# Patient Record
Sex: FEMALE | Race: WHITE
[De-identification: names, ages, dates, MRNs, and addresses within clinical notes are randomized per-mention and may not be internally consistent; named-entity substitution may affect disease eponyms.]

---

## 2018-01-29 ENCOUNTER — HOSPITAL ENCOUNTER (OUTPATIENT)
Dept: HOSPITAL 80 - FIMAGING | Age: 38
End: 2018-01-29
Attending: OBSTETRICS & GYNECOLOGY
Payer: COMMERCIAL

## 2018-01-29 DIAGNOSIS — O36.62X0: ICD-10-CM

## 2018-01-29 DIAGNOSIS — O09.522: Primary | ICD-10-CM

## 2018-01-29 DIAGNOSIS — Z98.891: ICD-10-CM

## 2018-01-29 DIAGNOSIS — Z3A.21: ICD-10-CM

## 2018-06-04 ENCOUNTER — HOSPITAL ENCOUNTER (INPATIENT)
Dept: HOSPITAL 80 - FLD | Age: 38
LOS: 2 days | Discharge: HOME | End: 2018-06-06
Attending: OBSTETRICS & GYNECOLOGY | Admitting: OBSTETRICS & GYNECOLOGY
Payer: COMMERCIAL

## 2018-06-04 DIAGNOSIS — O34.219: Primary | ICD-10-CM

## 2018-06-04 DIAGNOSIS — Z3A.39: ICD-10-CM

## 2018-06-04 DIAGNOSIS — Z30.2: ICD-10-CM

## 2018-06-04 LAB — PLATELET # BLD: 98 10^3/UL (ref 150–400)

## 2018-06-04 PROCEDURE — 0UB70ZZ EXCISION OF BILATERAL FALLOPIAN TUBES, OPEN APPROACH: ICD-10-PCS | Performed by: OBSTETRICS & GYNECOLOGY

## 2018-06-04 RX ADMIN — Medication SCH MG: at 20:22

## 2018-06-04 RX ADMIN — KETOROLAC TROMETHAMINE PRN MG: 30 INJECTION, SOLUTION INTRAMUSCULAR at 20:21

## 2018-06-04 RX ADMIN — KETOROLAC TROMETHAMINE PRN MG: 30 INJECTION, SOLUTION INTRAMUSCULAR at 10:47

## 2018-06-04 RX ADMIN — DOCUSATE SODIUM AND SENNOSIDES SCH TAB: 50; 8.6 TABLET ORAL at 20:22

## 2018-06-04 NOTE — OBDEL
Birth Info


Birth Type: Repeat 


Presentation at Delivery: Vertex


L&D Analgesia/Anesthesia Type: Spinal


GBS+: No


Intrapartum Medications: 





 














Discontinued Medications














Generic Name Dose Route Start Last Admin





  Trade Name Dahiana  PRN Reason Stop Dose Admin


 


Cefazolin Sodium/Dextrose  100 mls @ 200 mls/hr  18 05:39  18 07:38





  Ancef 2 Gm  IV  18 06:08  100 mls





  ONCALL ONE   Administration





  Protocol   














- Infant Care Provider


Pediatrician/NNP: Tomasa Boyd





- Hospital Course


Intrapartum: 





18 10:23


IUP @ 39 weeks with previous c/s secondary to breech who desires RCS, declines 

trial of labor and requests permanent sterilization-family status is complete. 

AMA with negative NIPT. LGA fetus with EFW >90%. 


Indications for Delivery: Elective (Previous c/s, declines trial of labor)





 Operative Report





- Delivery


Pre-op Diagnoses: IUP @ 39 weeks with previous c/s who desires RCS, declines a 

trial of labor; request for permanent sterilization-family status is complete


Post-op Diagnoses: IUP @ 39 weeks with previous c/s who desires RCS, declines a 

trial of labor; request for permanent sterilization-family status is complete


History of Prior  Section: Yes


Number of Prior  Sections: 1


Indications for Prior  Section: Breech


Indications for Current  Section: Elective/Repeat (Declines trial of 

labor)


Procedure: Scheduled, Low Transverse, Tubal Ligation (Modified Analia)


Surgeon: Enma Garcia


Assistant: Lucrecia Cintron


Anesthesiologist: Pedro Daniel


Complications: None


Findings: 





A viable male infant in cephalic presentation born at 0835 with 8 and 8 Apgars 

weighing 10-2. Delayed cord clamping x 60 sec. Cord clamped x 2 and then cut. 

Infant to waiting NNP. Cord blood obtained. Placenta delivered intact 

spontaneously with 3-vc. Grossly normal appearing uterus, tubes and ovaries.


Specimen(s)/Path: Fallopian Tube(s) (Portions of b/l tubes)


IV Fluid (ml): 3,200


EBL: 800 cc                                 UO: 50 cc clear urine





 Birth Data


ANKUR: 18


Gestational Age: 39 week(s) and 0 day(s)


  ** Tellez


Delivery Date: 18


Delivery Time: 08:35


Sex of Infant: Male


Apgar Score (1 Min): 8


Apgar Score (5 Min): 8





ICD10 Worksheet


Patient Problems: 


 Problems











Problem Status Onset


 


Status post repeat low transverse  section Acute  


 


Encounter for female sterilization procedure Acute  


 


Previous  delivery affecting pregnancy Acute

## 2018-06-04 NOTE — PDGENHP
History & Physical


Chief Complaint: IUP @ 39 weeks with previous c/s for RCS and request for 

sterilization 


History of Present Illness: 36 y/o  @ 39 weeks with ANKUR 18 by u/s 

at 8 2/7 weeks with previous c/s for breech and LGA who desires RCS and 

requests permanent sterilization, family status is complete. Pregnancy 

complicated by AMA, negative NIPT. Previous c/s for breech and LGA; growth u/s 

in third trimester revealed EFW >90% with normal MARIE. Platelets being followed, 

<100 but stable. She has breast implants, desires to breastfeed. She developed 

anemia of pregnancy and tolerating iron. Received Tdap. GBS culture is 

negative.  PNLs: A+, Ab negative; RPR NR; Rubella immune; HBaAg negative; HIV 

negative; Trio screen neg ; Standardf panel neg ; UA/cx negative; Pap, 

GC/CT cx's all negative; Innatal negative; AFP negative; H/H 11.7/33.9; 1hr GTT 

80; GBS negative


Pertinent Past, Social, Family History: PObHx: 2015-PCS at 39 weeks secondary 

to breech, viable female infant weighing 9-4.  PGynHx: Menarche at age 13; 

cycles every 28 days x 4 days; no h/o abn pap smears or STDs.  PMHx: 

Unremarkable.  PSHx: PCS in ; breast implants in ; wisdom teeth 

extraction at age 17.  FamHx: MGF with prostate Ca; MGM with lymphoma.  SocHx: 

Pt is  and lives with her  and their daughter; denies any alcohol

, tobacco and illicit drug use


Relevant Physical Exam: WN, WD female.  Alert and oriented x 3.  CV: RRR 

without murmur.  Resp: LCTA b/l.  Abd: Gravid, nontender, nondistened, active 

BS.  Pelvic: Def.  Ext: Normal to inspection without calf edema or tenderness


Cardiorespiratory Assessment: A/P: 36 y/o  @ 39 weeks with previous c/s 

for RCS, declines trial of labor and request for permanent sterilization, 

family status is complete.  -Admit to L&D.  -Consents on chart and reviewed 

with pt; pt is aware of risks and wants to proceed with surgery and states 

family status is complete.  -Abx on call to OR.  -SCDs for DVT prophylaxis

## 2018-06-04 NOTE — PREANESOB
Obstetric Pre-Anesthesia Info





- General Info


Proposed Procedure: Repeat C Section and bilateral tubal ligation.


: 2


Para: 1


ANKUR: 18


Gestational Age: 39 week(s) and 0 day(s)





- Fetal Info


Fetal Status: Full Term


Fetal Monitors: External


FHR Baseline (bpm): 120


FHR Pattern: Reassuring





- Labor Status


Indications for Current  Section: Elective/Repeat


Labor Epidural: No





Anesthesia


ROS: 


Prior breast surgery, wisdom teeth removal, and C Section for breech.


Allergies/Adverse Reactions: 


 











Allergy/AdvReac Type Severity Reaction Status Date / Time


 


No Known Allergies Allergy   Unverified 01/26/15 08:34











Home Medications: 














 Medication  Instructions  Recorded


 


Hydrocodone/APAP 5/325 [Norco 1 - 2 tab PO Q4 PRN #30 tab 01/29/15





5/325 (*)]  


 


Ibuprofen [Motrin (*)] 600 mg PO Q6 PRN #30 tab 01/29/15


 


Iron Polysacch/Iron Heme Polyp 28 mg PO DAILY #30 tab 01/29/15





[Bifera]  











Visit Medications: 





 











Generic Name Dose Route Start Last Admin





  Trade Name Freq  PRN Reason Stop Dose Admin


 


Diphenhydramine HCl  25 - 50 mg  18 09:58  





  Benadryl Injection  IVP  18 09:57  





  Q6HRS PRN   





  Itching   


 


Lactated Ringer's  1,000 mls @ 125 mls/hr  18 06:00  





  Lr  IV  18 05:59  





  CONT TAQUERIA   


 


Naloxone HCl  0.4 mg  18 09:58  





  Narcan  IVP  18 10:00  





  PRN PRN   





  respiratory depression   


 


Ondansetron HCl  4 mg  18 09:58  





  Zofran  IVP  18 09:57  





  Q4HRS PRN   





  Nausea/Vomiting, Can't Take PO   


 


Phenylephrine HCl  100 mcg  18 09:58  





  Neosynephrine  IVP  18 10:58  





  Q1M PRN   





  Hypotension   














Discontinued Medications














Generic Name Dose Route Start Last Admin





  Trade Name Freq  PRN Reason Stop Dose Admin


 


Citric Acid/Sodium Citrate  30 ml  18 05:39  





  Bicitra  PO  18 05:40  





  ONCALL ONE   


 


Dexamethasone  Confirm  18 08:29  





  Decadron Injection  Administered  18 08:30  





  Dose   





  4 mg   





  .ROUTE   





  .STK-MED ONE   


 


Dexamethasone  Confirm  18 08:29  





  Decadron Injection  Administered  18 08:30  





  Dose   





  4 mg   





  .ROUTE   





  .STK-MED ONE   


 


Fentanyl  Confirm  18 07:51  





  Sublimaze  Administered  18 07:52  





  Dose   





  100 mcg   





  .ROUTE   





  .STK-MED ONE   


 


Cefazolin Sodium/Dextrose  100 mls @ 200 mls/hr  18 05:39  18 07:38





  Ancef 2 Gm  IV  18 06:08  100 mls





  ONCALL ONE   Administration





  Protocol   


 


Lactated Ringer's  500 mls @ 0 mls/hr  18 05:39  





  Lr  IV  18 05:40  





  ONCE ONE   





  As Directed   


 


Morphine Sulfate  Confirm  18 07:51  





  Morphine Pf 5 Mg/10 Ml  Administered  18 07:52  





  Dose   





  5 mg   





  .ROUTE   





  .STK-MED ONE   


 


Ondansetron HCl  Confirm  18 08:29  





  Zofran  Administered  18 08:30  





  Dose   





  4 mg   





  .ROUTE   





  .STK-MED ONE   


 


Ondansetron HCl  Confirm  18 08:29  





  Zofran  Administered  18 08:30  





  Dose   





  4 mg   





  .ROUTE   





  .STK-MED ONE   


 


Oxytocin  Confirm  18 08:29  





  Pitocin  Administered  18 08:30  





  Dose   





  100 units   





  .ROUTE   





  .STK-MED ONE   


 


Phenylephrine HCl  Confirm  18 08:24  





  Neosynephrine  Administered  18 08:25  





  Dose   





  2,000 mcg   





  .ROUTE   





  .STK-MED ONE   


 


Phenylephrine HCl  Confirm  18 08:29  





  Neosynephrine  Administered  18 08:30  





  Dose   





  1,000 mcg   





  .ROUTE   





  .STK-MED ONE   














- Anesthesia History


Response to Local Anesthetics: Normal


Anesthesia & Operative History: No Prior Problems


Family Anesthesia History: Negative





- Social History


Substance Use/Abuse: Denies





- Vital Signs


Latest Vital Signs (Nursing): 





 











Temp Pulse Resp BP Pulse Ox


 


 36.6 C   84   19   91/64 L  97 


 


 18 07:30  18 07:30  18 09:35  18 09:35  18 09:35











Blood Pressure: 125/79


Heart Rate: 64


Height/Weight (Nursing): 





 











Height                         167.64 cm


 


Weight                         73.936 kg

















- Focused Exam


Neck exam: FROM


Mallampati Score: Class 3


Mouth exam: normal dental/mouth exam


Pulmonary: no respiratory distress


Cardiovascular: regular rate and rhythym


Labs: 





 





 18 05:50 





 











Patient ABO/Rh  A POSITIVE   18  05:50    














- Plan


Anesthetic Plan: SAB


Consent Signed and on Chart: Yes


Patient/Guardian Understands and Agrees to Plan: Yes


Urgent/Emergent Case: Ame pérez completed preop but documented later for safe 

timely pt care

## 2018-06-05 RX ADMIN — KETOROLAC TROMETHAMINE PRN MG: 30 INJECTION, SOLUTION INTRAMUSCULAR at 08:29

## 2018-06-05 RX ADMIN — IBUPROFEN PRN MG: 600 TABLET ORAL at 16:11

## 2018-06-05 RX ADMIN — IBUPROFEN PRN MG: 600 TABLET ORAL at 21:56

## 2018-06-05 RX ADMIN — DOCUSATE SODIUM AND SENNOSIDES SCH TAB: 50; 8.6 TABLET ORAL at 22:56

## 2018-06-05 RX ADMIN — Medication SCH MG: at 21:56

## 2018-06-05 RX ADMIN — HYDROCODONE BITARTRATE AND ACETAMINOPHEN PRN TAB: 5; 325 TABLET ORAL at 17:11

## 2018-06-05 RX ADMIN — KETOROLAC TROMETHAMINE PRN MG: 30 INJECTION, SOLUTION INTRAMUSCULAR at 02:01

## 2018-06-05 RX ADMIN — HYDROCODONE BITARTRATE AND ACETAMINOPHEN PRN TAB: 5; 325 TABLET ORAL at 17:53

## 2018-06-05 RX ADMIN — DOCUSATE SODIUM AND SENNOSIDES SCH TAB: 50; 8.6 TABLET ORAL at 08:31

## 2018-06-05 RX ADMIN — Medication SCH MG: at 08:31

## 2018-06-06 VITALS — SYSTOLIC BLOOD PRESSURE: 97 MMHG | DIASTOLIC BLOOD PRESSURE: 58 MMHG

## 2018-06-06 RX ADMIN — IBUPROFEN PRN MG: 600 TABLET ORAL at 04:03

## 2018-06-06 RX ADMIN — HYDROCODONE BITARTRATE AND ACETAMINOPHEN PRN TAB: 5; 325 TABLET ORAL at 06:10

## 2018-06-06 RX ADMIN — DOCUSATE SODIUM AND SENNOSIDES SCH: 50; 8.6 TABLET ORAL at 12:03

## 2018-06-06 RX ADMIN — HYDROCODONE BITARTRATE AND ACETAMINOPHEN PRN TAB: 5; 325 TABLET ORAL at 01:17

## 2018-06-06 RX ADMIN — Medication SCH MG: at 11:34

## 2018-06-06 RX ADMIN — HYDROCODONE BITARTRATE AND ACETAMINOPHEN PRN TAB: 5; 325 TABLET ORAL at 11:34

## 2018-06-06 RX ADMIN — IBUPROFEN PRN MG: 600 TABLET ORAL at 11:34

## 2018-06-06 NOTE — OBGCSDC
General Delivery Information





- General Info


: 2


Para: 2


Abortions: 0


Birth Type: Repeat 


L&D Analgesia/Anesthesia Type: Spinal


Admission Date: 18


Labs: 





 











Patient ABO/Rh  A POSITIVE   18  05:50    


 


Hct  29.4 % (38.0-47.0)  L  18  02:30    














- Hospital Course


Intrapartum: 





18 10:23


IUP @ 39 weeks with previous c/s secondary to breech who desires RCS, declines 

trial of labor and requests permanent sterilization-family status is complete. 

AMA with negative NIPT. LGA fetus with EFW >90%. 





 Birth





- Delivery Providers


Surgeon: Enma Garcia


Assistant: Lucrecia Cintron


Anesthesiologist: Pedro Daniel





-  Delivery


Number of Prior  Sections: 1


Indications for Current  Section: Elective/Repeat (Declines trial of 

labor)


Surgical Procedures: Scheduled, Low Transverse, Tubal Ligation (Modified Analia

)


Intra-op Complications: None


EBL: 800 cc                                 UO: 50 cc clear urine





[place] Birth Data


ANKUR: 18


Gestational Age: 39 week(s) and 2 day(s)


  ** Tellez


Delivery Date: 18


Delivery Time: 08:35


Sex of Infant: Male


[place] Weight (gm): 4588 g


Apgar Score (1 Min): 8


Apgar Score (5 Min): 8

## 2018-06-06 NOTE — GOP
[f 
rep st]



                                                                OPERATIVE REPORT





DATE OF OPERATION:  2018



SURGEON:  Enma Garcia DO



ASSISTANT:  CHERRY Fiore.



ANESTHESIA:  Spinal.



ANESTHESIOLOGIST:  Pedro Daniel MD.



PREOPERATIVE DIAGNOSIS:  

1.  Intrauterine pregnancy at 39 weeks with previous  section.

2.  Desires repeat  section, declines trial of labor.

3.  Request permanent sterilization, family status  is complete.



POSTOPERATIVE DIAGNOSIS:  

1.  Intrauterine pregnancy at 39 weeks with previous  section.

2.  Desires repeat  section, declines trial of labor.

3.  Request permanent sterilization, family status is complete.



PROCEDURE PERFORMED:  Repeat low transverse  section, bilateral tubal 
ligation.



FINDINGS:  A viable male infant, in cephalic presentation born at 0835 with 8 
and 8 Apgars, weighing 10 pounds 2 ounces.  Delayed cord clamping occurred x60 
seconds.  Cord was then clamped x2 and cut.  Infant to awaiting NNP.  Cord 
blood was obtained.  Placenta delivered intact spontaneously with 3-vessel 
cord.  Grossly normal-appearing uterus, tubes, and ovaries.





ESTIMATED BLOOD LOSS:  800 cc.



INDICATIONS:  The patient is a 37-year-old,  2, para 1-0-0-1, at 39 
weeks with a previous  for breech and LGA infant who desires repeat C-
section at this time,  she declines a trial of labor.  Patient is also 
requesting permanent sterilization and states family status is complete.  
Discussed the risks, benefits, alternatives of the procedure including, but not 
limited to, bleeding, infection, and damage to surrounding organs.  Also 
discussed the risk of ectopic pregnancy and failure rate with bilateral tubal 
ligation.  Patient understands all risks of the procedure and wants to proceed 
with surgery at this time.  Informed consents were obtained.



DESCRIPTION OF PROCEDURE:  The patient was taken to the operating room where 
spinal anesthesia was obtained without difficulty.  The patient was prepped and 
draped in the usual sterile fashion and placed in dorsal supine position with 
leftward tilt.  Givens catheter was then placed.  A Pfannenstiel skin incision 
was made with the scalpel, and carried through to the underlying fascia using 
Bovie.  The fascia was then incised in the midline and extended laterally using 
the Escobedo scissors.  Hemostasis was noted.  Kocher clamps were then used to 
elevate the superior aspect of the fascial incision which was then elevated and 
the underlying rectus muscles dissected off using the Escobedo scissors.  Attention 
was then turned to the inferior aspect of the fascial incision which in a 
similar fashion was grasped with Kocher clamps elevated, and the underlying 
rectus muscles were dissected off using the Escobedo scissors.  Rectus muscles were 
then dissected in the midline.  



The peritoneum was then identified, and entered using Metzenbaum scissors.  
Incision was then extended superiorly and inferiorly with good visualization of 
the bladder.  Bladder blade was then inserted.  Vesicouterine peritoneum was 
identified, and entered sharply with Metzenbaum scissors.  Incision was then 
extended laterally, and the bladder flap created digitally.  Bladder blade was 
then reinserted.  The lower uterine segment was incised in transverse fashion 
using a scalpel.  Infant's head was then delivered without difficulty.  Nose 
and mouth were bulb suctioned.  Cord clamping was delayed for 60 seconds.  Cord 
was then clamped x2 and cut. Cord blood was obtained.  Infant was subsequently 
handed to [facility].  The placenta was then delivered spontaneously intact 
with a 3-vessel cord.  Uterus was then exteriorized, and cleared of all blood, 
clots and debris.  Uterine incision was repaired in 2 layers using 0 Vicryl 
suture.  First layer of the uterus was closed with a running lock stitch of 0 
Vicryl.  Hemostasis was noted.  A 2nd layer was an imbricating layer with 0 
Vicryl.  Again, hemostasis was noted.  



At this time, we turned our attention to the bilateral tubes.  There was noted 
to be very vascular with prominent vessels in the mesosalpinx and there would 
be an increased risk of bleeding doing a salpingectomy.  So at this time, a 
bilateral tubal ligation was performed via modified [place] technique. The 
right tube was identified and grasped with a [place] clamp and a 2-3 cm knuckle 
of tube was formed. It was ligated at the base with double tying with 0 plain 
gut and the ligated portion of the right tube was then excised and the lumen 
was cauterized.  A similar procedure was done on the left side. Portions of 
bilateral tubes were sent for pathologic evaluation. Both tubal ostia were 
visualized and noted to be hemostatic.  The uterus was then returned to the 
abdomen.  The gutters were cleared of all clots and debris.  The uterine 
incision was visualized again and noted to be oozing so Moreno was placed on 
the incision to achieve hemostasis. The rectus muscles were then reapproximated 
using 3-0 Vicryl.  The fascia was then closed with 0 Vicryl in a running 
stitch.  Hemostasis was noted.  Subcuticular was then closed with 4-0 Vicryl on 
a Delroy needle.  Patient tolerated the procedure well.  There were no 
complications.  Sponge, lap, and needle counts correct x2.  The patient was 
then taken to the recovery room in stable condition.



IV FLUIDS:  3200 cc LR.



URINE OUTPUT:  50 cc of clear urine at the end of the procedure.





Job #:  [id number]

MTDD

## 2019-07-22 NOTE — OBPP
PostPartum Progress Note


Assessment/Plan: 


Assessment:


























Plan:





Objective: 





 





 06/05/18 02:30 





 











Patient ABO/Rh  A POSITIVE   06/04/18  05:50    








 











Temp Pulse Resp BP Pulse Ox


 


 36.6 C   72   16   97/58 L  96 


 


 06/06/18 08:00  06/06/18 08:00  06/06/18 08:00  06/06/18 08:00  06/06/18 08:00
PostPartum Progress Note


Assessment/Plan: 


Assessment:


38 y/o  s/p repeat C/S with BTL at 39 weeks ega


POD #1


Anemia: HCT today 29.4 (35.6 on admission)























Plan:


Routine post partum care 


BID ferrous sulfate


Will d/c nguyen and remove dressing this am


Encourage ambulation as tolerated





18 08:54





18 13:25





18 13:26





Objective: 





 





 18 02:30 





 











Patient ABO/Rh  A POSITIVE   18  05:50    








 











Temp Pulse Resp BP Pulse Ox


 


 36.4 C   61   18   90/48 L  99 


 


 18 08:49  18 08:49  18 08:49  18 08:49  18 08:49











Uterine Position/Fundal Height: At Umbilicus


Uterine Tone: Firm





PostPartum Physical Exam





- Physical Exam


EENT: PERRL/EOMI, normal ENT inspection


Neck: non-tender


Respiratory: chest non-tender


Cardiac/Chest: normal peripheral pulses


Abdomen: hypoactive bowel sounds, flatus, dressing (clean and dry)


Extremities: normal range of motion


Back: Normal inspection


Skin: normal color, warm/dry


Neuro/Psych: no motor/sensory deficits, alert, normal mood/affect, oriented x 3
No